# Patient Record
Sex: FEMALE | Race: WHITE | NOT HISPANIC OR LATINO | Employment: FULL TIME | URBAN - METROPOLITAN AREA
[De-identification: names, ages, dates, MRNs, and addresses within clinical notes are randomized per-mention and may not be internally consistent; named-entity substitution may affect disease eponyms.]

---

## 2025-02-06 ENCOUNTER — HOSPITAL ENCOUNTER (EMERGENCY)
Facility: HOSPITAL | Age: 60
Discharge: HOME | End: 2025-02-06
Attending: STUDENT IN AN ORGANIZED HEALTH CARE EDUCATION/TRAINING PROGRAM
Payer: COMMERCIAL

## 2025-02-06 VITALS
BODY MASS INDEX: 24.84 KG/M2 | SYSTOLIC BLOOD PRESSURE: 145 MMHG | HEIGHT: 62 IN | OXYGEN SATURATION: 99 % | RESPIRATION RATE: 18 BRPM | DIASTOLIC BLOOD PRESSURE: 89 MMHG | WEIGHT: 135 LBS | HEART RATE: 85 BPM | TEMPERATURE: 98 F

## 2025-02-06 DIAGNOSIS — H81.11 BENIGN PAROXYSMAL POSITIONAL VERTIGO OF RIGHT EAR: Primary | ICD-10-CM

## 2025-02-06 PROCEDURE — 99283 EMERGENCY DEPT VISIT LOW MDM: CPT | Performed by: STUDENT IN AN ORGANIZED HEALTH CARE EDUCATION/TRAINING PROGRAM

## 2025-02-06 PROCEDURE — 2500000002 HC RX 250 W HCPCS SELF ADMINISTERED DRUGS (ALT 637 FOR MEDICARE OP, ALT 636 FOR OP/ED)

## 2025-02-06 RX ORDER — ONDANSETRON 4 MG/1
4 TABLET, ORALLY DISINTEGRATING ORAL EVERY 8 HOURS PRN
Qty: 20 TABLET | Refills: 0 | Status: SHIPPED | OUTPATIENT
Start: 2025-02-06 | End: 2025-02-06

## 2025-02-06 RX ORDER — MECLIZINE HYDROCHLORIDE 25 MG/1
25 TABLET ORAL 3 TIMES DAILY PRN
Qty: 30 TABLET | Refills: 0 | Status: SHIPPED | OUTPATIENT
Start: 2025-02-06 | End: 2025-02-06

## 2025-02-06 RX ORDER — MECLIZINE HCL 12.5 MG 12.5 MG/1
25 TABLET ORAL ONCE
Status: COMPLETED | OUTPATIENT
Start: 2025-02-06 | End: 2025-02-06

## 2025-02-06 RX ORDER — ONDANSETRON 4 MG/1
4 TABLET, ORALLY DISINTEGRATING ORAL EVERY 8 HOURS PRN
Qty: 20 TABLET | Refills: 0 | Status: SHIPPED | OUTPATIENT
Start: 2025-02-06 | End: 2025-02-13

## 2025-02-06 RX ORDER — MECLIZINE HYDROCHLORIDE 25 MG/1
25 TABLET ORAL 3 TIMES DAILY PRN
Qty: 30 TABLET | Refills: 0 | Status: SHIPPED | OUTPATIENT
Start: 2025-02-06 | End: 2025-02-16

## 2025-02-06 RX ADMIN — MECLIZINE 25 MG: 12.5 TABLET ORAL at 13:11

## 2025-02-06 ASSESSMENT — COLUMBIA-SUICIDE SEVERITY RATING SCALE - C-SSRS
2. HAVE YOU ACTUALLY HAD ANY THOUGHTS OF KILLING YOURSELF?: NO
1. IN THE PAST MONTH, HAVE YOU WISHED YOU WERE DEAD OR WISHED YOU COULD GO TO SLEEP AND NOT WAKE UP?: NO
6. HAVE YOU EVER DONE ANYTHING, STARTED TO DO ANYTHING, OR PREPARED TO DO ANYTHING TO END YOUR LIFE?: NO

## 2025-02-06 ASSESSMENT — LIFESTYLE VARIABLES
HAVE PEOPLE ANNOYED YOU BY CRITICIZING YOUR DRINKING: NO
TOTAL SCORE: 0
HAVE YOU EVER FELT YOU SHOULD CUT DOWN ON YOUR DRINKING: NO
EVER HAD A DRINK FIRST THING IN THE MORNING TO STEADY YOUR NERVES TO GET RID OF A HANGOVER: NO
EVER FELT BAD OR GUILTY ABOUT YOUR DRINKING: NO

## 2025-02-06 ASSESSMENT — PAIN - FUNCTIONAL ASSESSMENT: PAIN_FUNCTIONAL_ASSESSMENT: 0-10

## 2025-02-06 ASSESSMENT — PAIN SCALES - GENERAL: PAINLEVEL_OUTOF10: 0 - NO PAIN

## 2025-02-06 NOTE — DISCHARGE INSTRUCTIONS
Would recommend trying the Epley maneuver as discussed.  You can take the meclizine and Zofran symptomatic relief in the meantime.  Please help with ENT if symptoms do not improve    --    Otolaryngology/ENT Clinic  Phone: [phone]

## 2025-02-06 NOTE — ED TRIAGE NOTES
Patient arrives by ems, states that two years ago she developed  vertigo and today she looked to her left and got lightheaded and dizzy her coworkers said she looked flushed, she has not had an episode for two years.

## 2025-02-06 NOTE — ED PROVIDER NOTES
HPI   Chief Complaint   Patient presents with    Dizziness    Vertigo       Patient is a 59-year-old female with past medical history of DM, HTN, heart murmur, dizziness associated with viral sinusitis years ago presenting with concern for transient episodes of dizziness.  Patient reports dizziness feels like the room is moving and does not endorse lightheadedness.  Denies chest pain, shortness of breath, weakness, sensory loss or other focal deficits.  Reports episode happened when she was standing up at work without clear provocation.  Reports that it resolved within seconds.  Does report feeling flushed, mildly nauseous at the time.  Endorses she has had several episodes since of transient episodes of room moving that the patient associates with movement of her head.  Endorses no current symptoms when she is lying down at rest.  Does report some chronic age-related loss of hearing but denies any acute changes.  Denies tinnitus, ear fullness.  Denies recent cold, flu, fevers, chills, cough, congestion or other infectious symptoms.            Patient History   No past medical history on file.  No past surgical history on file.  No family history on file.  Social History     Tobacco Use    Smoking status: Not on file    Smokeless tobacco: Not on file   Substance Use Topics    Alcohol use: Not on file    Drug use: Not on file       Physical Exam   ED Triage Vitals   Temperature Heart Rate Respirations BP   02/06/25 1216 02/06/25 1216 02/06/25 1216 02/06/25 1224   36.7 °C (98 °F) 75 18 140/88      Pulse Ox Temp src Heart Rate Source Patient Position   02/06/25 1216 -- 02/06/25 1216 02/06/25 1224   100 %  Monitor Lying      BP Location FiO2 (%)     02/06/25 1224 --     Left arm        Physical Exam  Constitutional:       Appearance: Normal appearance.   HENT:      Head: Normocephalic and atraumatic.   Eyes:      Extraocular Movements: Extraocular movements intact.   Cardiovascular:      Rate and Rhythm: Normal rate.    Pulmonary:      Effort: Pulmonary effort is normal.   Musculoskeletal:         General: Normal range of motion.      Cervical back: Normal range of motion.   Skin:     General: Skin is warm and dry.   Neurological:      Mental Status: She is alert.      Comments: Alert and oriented x 4.  Cranial nerves II through XII intact.  5/5 strength bilateral upper and lower extremities.  Sensation tact light touch in upper and lower extremities.  Positive [place]-Hallpike on the right with several beats of fatigable nystagmus.  Mild reported vertigo symptoms with [place]-Hallpike to the left but reportedly much milder than on the right, no visible nystagmus.  No nystagmus at rest.   Psychiatric:         Mood and Affect: Mood normal.         Behavior: Behavior normal.           ED Course & MDM   ED Course as of 02/06/25 1410   Thu Feb 06, 2025 1335 This is a 59-year-old female who presents emerged part for dizziness.  On exam she has fatigable nystagmus when looking to the right.  She is able to ambulate well without any issue has no phonologic deficits.  Here patient did Epley maneuver which improved her symptoms she states she is no longer having symptoms.  Low suspicion for intracranial abnormality such as a posterior stroke given that patient symptoms are now gone.  She understands that if she does not worsen she will need to return to emergency department.  Most likely vertigo [CF]      ED Course User Index  [CF] Yaritza Norris MD         Diagnoses as of 02/06/25 1410   Benign paroxysmal positional vertigo of right ear                 No data recorded     [place] Coma Scale Score: 15 (02/06/25 1221 : Samaria Campos RN)                           Medical Decision Making  EKG shows a normal rate of 71bpm, normal sinus rhythm, normal axis,  ms,  ms, QTc 475 ms. Normal ST and T wave pattern with no evidence of acute ischemia or other acute findings.    Patient is a 59-year-old female with past medical history of  DM, HTN, heart murmur, dizziness associated with viral sinusitis years ago presenting with concern for transient episodes of dizziness.  Description more consistent with vertigo.  Positional transient nature without other focal neurologic deficits consistent with BPPV.  Does have markedly positive [place]-Hallpike on the right.  No symptoms at rest, no nystagmus at all reducing pretest probability of central process particularly given no other deficits.  Treated with improvement with Epley maneuver here.  Advised on at home Epley maneuver if symptoms recur.  Additionally prescribed meclizine and Zofran to treat symptoms if they recur.  Advised to follow-up with ENT if symptoms persist.  Return precautions and appropriate follow-up discussed with patient and patient discharged home.    Patient seen and discussed with Dr. Myrna Vázquez MD, PhD  Emergency Medicine PGY3          Procedure  Procedures     Wander Vázquez MD  Resident  02/06/25 4043